# Patient Record
Sex: FEMALE | Race: BLACK OR AFRICAN AMERICAN | NOT HISPANIC OR LATINO | URBAN - METROPOLITAN AREA
[De-identification: names, ages, dates, MRNs, and addresses within clinical notes are randomized per-mention and may not be internally consistent; named-entity substitution may affect disease eponyms.]

---

## 2018-10-17 PROBLEM — 59621000 ESSENTIAL HYPERTENSION: Status: ACTIVE | Noted: 2018-10-17

## 2018-10-17 PROBLEM — 275978004 SCREENING FOR MALIGNANT NEOPLASM OF COLON: Status: ACTIVE | Noted: 2018-10-17

## 2020-09-18 ENCOUNTER — OFFICE VISIT (OUTPATIENT)
Dept: URBAN - METROPOLITAN AREA CLINIC 29 | Facility: CLINIC | Age: 70
End: 2020-09-18

## 2020-09-18 PROBLEM — 2901004 MELENA: Status: ACTIVE | Noted: 2020-09-18

## 2020-10-30 ENCOUNTER — OFFICE VISIT (OUTPATIENT)
Dept: URBAN - METROPOLITAN AREA SURGERY CENTER 7 | Facility: SURGERY CENTER | Age: 70
End: 2020-10-30

## 2020-11-11 ENCOUNTER — OFFICE VISIT (OUTPATIENT)
Dept: URBAN - METROPOLITAN AREA CLINIC 29 | Facility: CLINIC | Age: 70
End: 2020-11-11

## 2020-12-04 ENCOUNTER — OFFICE VISIT (OUTPATIENT)
Dept: URBAN - METROPOLITAN AREA CLINIC 29 | Facility: CLINIC | Age: 70
End: 2020-12-04

## 2021-01-25 ENCOUNTER — OFFICE VISIT (OUTPATIENT)
Dept: URBAN - METROPOLITAN AREA CLINIC 29 | Facility: CLINIC | Age: 71
End: 2021-01-25

## 2021-03-08 ENCOUNTER — OFFICE VISIT (OUTPATIENT)
Dept: URBAN - METROPOLITAN AREA CLINIC 29 | Facility: CLINIC | Age: 71
End: 2021-03-08

## 2021-03-08 PROBLEM — 70153002 HEMORRHOIDS: Status: ACTIVE | Noted: 2021-03-08

## 2021-03-08 PROBLEM — 414916001 OBESITY: Status: ACTIVE | Noted: 2021-03-08

## 2022-04-30 ENCOUNTER — TELEPHONE ENCOUNTER (OUTPATIENT)
Dept: URBAN - METROPOLITAN AREA CLINIC 121 | Facility: CLINIC | Age: 72
End: 2022-04-30

## 2022-04-30 RX ORDER — SODIUM PICOSULFATE, MAGNESIUM OXIDE, AND ANHYDROUS CITRIC ACID 10; 3.5; 12 MG/160ML; G/160ML; G/160ML
MIX AND USE AS DIRECTED LIQUID ORAL
OUTPATIENT
Start: 2018-10-17

## 2022-04-30 RX ORDER — SODIUM PICOSULFATE, MAGNESIUM OXIDE, AND ANHYDROUS CITRIC ACID 10; 3.5; 12 MG/160ML; G/160ML; G/160ML
MIX AND USE AS DIRECTED LIQUID ORAL
OUTPATIENT
Start: 2018-10-17 | End: 2020-10-30

## 2022-05-01 ENCOUNTER — TELEPHONE ENCOUNTER (OUTPATIENT)
Dept: URBAN - METROPOLITAN AREA CLINIC 121 | Facility: CLINIC | Age: 72
End: 2022-05-01

## 2022-05-01 RX ORDER — HYDROCODONE BITARTRATE AND ACETAMINOPHEN 7.5; 3 MG/1; MG/1
TABLET ORAL
Status: ACTIVE | COMMUNITY
Start: 2020-10-30

## 2022-05-01 RX ORDER — CLONIDINE HYDROCHLORIDE 0.1 MG/1
TABLET ORAL
Status: ACTIVE | COMMUNITY
Start: 2020-10-30

## 2022-05-01 RX ORDER — HYDRALAZINE HYDROCHLORIDE 100 MG/1
TABLET ORAL
Status: ACTIVE | COMMUNITY
Start: 2020-10-30

## 2022-05-01 RX ORDER — HYDROCHLOROTHIAZIDE 25 MG/1
TABLET ORAL
Status: ACTIVE | COMMUNITY
Start: 2020-10-30

## 2022-05-01 RX ORDER — CYCLOBENZAPRINE HYDROCHLORIDE 10 MG/1
TABLET, FILM COATED ORAL
Status: ACTIVE | COMMUNITY
Start: 2020-10-30

## 2022-05-01 RX ORDER — INSULIN GLARGINE 100 [IU]/ML
INJECTION, SOLUTION SUBCUTANEOUS
Status: ACTIVE | COMMUNITY
Start: 2020-10-30

## 2022-05-01 RX ORDER — METOPROLOL TARTRATE 100 MG/1
TABLET, FILM COATED ORAL
Status: ACTIVE | COMMUNITY
Start: 2020-10-30

## 2022-05-01 RX ORDER — POLYETHYLENE GLYCOL 3350, SODIUM SULFATE, SODIUM CHLORIDE, POTASSIUM CHLORIDE, ASCORBIC ACID, SODIUM ASCORBATE 140-9-5.2G
MIX AND USE DIRECTED.   USE CO-PAY CARD BELOW:  BIN 019158  PCN: CNRX GROUP: AC68037001 ID: 5937377999 KIT ORAL
Status: ACTIVE | COMMUNITY
Start: 2020-09-18

## 2022-05-01 RX ORDER — GLIPIZIDE 5 MG/1
TABLET, EXTENDED RELEASE ORAL
Status: ACTIVE | COMMUNITY
Start: 2020-10-30

## 2022-05-01 RX ORDER — AMLODIPINE BESYLATE 5 MG/1
TABLET ORAL
Status: ACTIVE | COMMUNITY
Start: 2020-10-30

## 2022-05-01 RX ORDER — ROSUVASTATIN CALCIUM 10 MG/1
TABLET, FILM COATED ORAL
Status: ACTIVE | COMMUNITY
Start: 2020-10-30

## 2022-05-01 RX ORDER — INSULIN LISPRO 100 [IU]/ML
INJECTION, SOLUTION INTRAVENOUS; SUBCUTANEOUS
Status: ACTIVE | COMMUNITY
Start: 2020-10-30

## 2022-05-01 RX ORDER — IRBESARTAN 300 MG/1
TABLET ORAL
Status: ACTIVE | COMMUNITY
Start: 2020-10-30

## 2023-11-20 ENCOUNTER — OFFICE VISIT (OUTPATIENT)
Dept: URBAN - METROPOLITAN AREA CLINIC 29 | Facility: CLINIC | Age: 73
End: 2023-11-20

## 2023-12-20 ENCOUNTER — CLAIMS CREATED FROM THE CLAIM WINDOW (OUTPATIENT)
Dept: URBAN - METROPOLITAN AREA CLINIC 29 | Facility: CLINIC | Age: 73
End: 2023-12-20
Payer: MEDICARE

## 2023-12-20 VITALS
BODY MASS INDEX: 34.68 KG/M2 | HEIGHT: 59 IN | SYSTOLIC BLOOD PRESSURE: 136 MMHG | HEART RATE: 97 BPM | WEIGHT: 172 LBS | DIASTOLIC BLOOD PRESSURE: 91 MMHG

## 2023-12-20 DIAGNOSIS — Z12.11 SCREEN FOR COLON CANCER: ICD-10-CM

## 2023-12-20 DIAGNOSIS — K59.04 CHRONIC IDIOPATHIC CONSTIPATION: ICD-10-CM

## 2023-12-20 PROCEDURE — 99213 OFFICE O/P EST LOW 20 MIN: CPT | Performed by: INTERNAL MEDICINE

## 2023-12-20 PROCEDURE — 99203 OFFICE O/P NEW LOW 30 MIN: CPT | Performed by: INTERNAL MEDICINE

## 2023-12-20 RX ORDER — ROSUVASTATIN CALCIUM 10 MG/1
TABLET, FILM COATED ORAL
Status: ACTIVE | COMMUNITY
Start: 2020-10-30

## 2023-12-20 RX ORDER — INSULIN LISPRO 100 [IU]/ML
INJECTION, SOLUTION INTRAVENOUS; SUBCUTANEOUS
Status: ACTIVE | COMMUNITY
Start: 2020-10-30

## 2023-12-20 RX ORDER — METOPROLOL TARTRATE 100 MG/1
TABLET, FILM COATED ORAL
Status: ACTIVE | COMMUNITY
Start: 2020-10-30

## 2023-12-20 RX ORDER — IRBESARTAN 300 MG/1
TABLET ORAL
Status: ACTIVE | COMMUNITY
Start: 2020-10-30

## 2023-12-20 RX ORDER — GLIPIZIDE 5 MG/1
TABLET, EXTENDED RELEASE ORAL
Status: ACTIVE | COMMUNITY
Start: 2020-10-30

## 2023-12-20 RX ORDER — INSULIN GLARGINE 100 [IU]/ML
INJECTION, SOLUTION SUBCUTANEOUS
Status: ACTIVE | COMMUNITY
Start: 2020-10-30

## 2023-12-20 RX ORDER — HYDRALAZINE HYDROCHLORIDE 100 MG/1
TABLET ORAL
Status: ACTIVE | COMMUNITY
Start: 2020-10-30

## 2023-12-20 RX ORDER — HYDROCHLOROTHIAZIDE 25 MG/1
TABLET ORAL
Status: ACTIVE | COMMUNITY
Start: 2020-10-30

## 2023-12-20 RX ORDER — CLONIDINE HYDROCHLORIDE 0.1 MG/1
TABLET ORAL
Status: ACTIVE | COMMUNITY
Start: 2020-10-30

## 2023-12-20 RX ORDER — HYDROCODONE BITARTRATE AND ACETAMINOPHEN 7.5; 3 MG/1; MG/1
TABLET ORAL
Status: ACTIVE | COMMUNITY
Start: 2020-10-30

## 2023-12-20 RX ORDER — AMLODIPINE BESYLATE 5 MG/1
TABLET ORAL
Status: ACTIVE | COMMUNITY
Start: 2020-10-30

## 2023-12-20 RX ORDER — CYCLOBENZAPRINE HYDROCHLORIDE 10 MG/1
TABLET, FILM COATED ORAL
Status: ACTIVE | COMMUNITY
Start: 2020-10-30

## 2023-12-20 RX ORDER — POLYETHYLENE GLYCOL 3350, SODIUM SULFATE, SODIUM CHLORIDE, POTASSIUM CHLORIDE, ASCORBIC ACID, SODIUM ASCORBATE 140-9-5.2G
MIX AND USE DIRECTED.   USE CO-PAY CARD BELOW:  BIN 019158  PCN: CNRX GROUP: AC68037001 ID: 5937377999 KIT ORAL
Status: ACTIVE | COMMUNITY
Start: 2020-09-18

## 2023-12-20 RX ORDER — SODIUM, POTASSIUM,MAG SULFATES 17.5-3.13G
177ML SOLUTION, RECONSTITUTED, ORAL ORAL
Qty: 1 KIT | Refills: 0 | OUTPATIENT
Start: 2023-12-20 | End: 2023-12-21

## 2023-12-20 NOTE — HPI-TODAY'S VISIT:
Ms. Dacosta  is a 73-year old woman here for follow up.  She has a colonoscopy with 3  TAs in 2019.   She recetnly started on ozempic and has had some nausea associated.  She states her stools have become less frequent, but she had not been consistent with her fiber gummies.

## 2023-12-27 ENCOUNTER — LAB OUTSIDE AN ENCOUNTER (OUTPATIENT)
Dept: URBAN - METROPOLITAN AREA CLINIC 29 | Facility: CLINIC | Age: 73
End: 2023-12-27

## 2024-01-18 ENCOUNTER — OFFICE VISIT (OUTPATIENT)
Dept: URBAN - METROPOLITAN AREA SURGERY CENTER 7 | Facility: SURGERY CENTER | Age: 74
End: 2024-01-18
Payer: MEDICARE

## 2024-01-18 DIAGNOSIS — Z86.010 PERSONAL HISTORY OF COLON POLYPS: ICD-10-CM

## 2024-01-18 DIAGNOSIS — Z86.010 ADENOMAS PERSONAL HISTORY OF COLONIC POLYPS: ICD-10-CM

## 2024-01-18 DIAGNOSIS — K64.8 HEMORRHOIDS, INTERNAL. NON-BLEEDING: ICD-10-CM

## 2024-01-18 DIAGNOSIS — Z09 ENCNTR FOR F/U EXAM AFT TRTMT FOR COND OTH THAN MALIG NEOPLM: ICD-10-CM

## 2024-01-18 PROCEDURE — G0105 COLORECTAL SCRN; HI RISK IND: HCPCS | Performed by: INTERNAL MEDICINE

## 2024-01-18 PROCEDURE — 00812 ANES LWR INTST SCR COLSC: CPT | Performed by: ANESTHESIOLOGY

## 2024-01-18 PROCEDURE — G8907 PT DOC NO EVENTS ON DISCHARG: HCPCS | Performed by: INTERNAL MEDICINE

## 2024-01-26 ENCOUNTER — TELEPHONE ENCOUNTER (OUTPATIENT)
Dept: URBAN - METROPOLITAN AREA CLINIC 29 | Facility: CLINIC | Age: 74
End: 2024-01-26

## 2024-01-31 ENCOUNTER — OFFICE VISIT (OUTPATIENT)
Dept: URBAN - METROPOLITAN AREA CLINIC 27 | Facility: CLINIC | Age: 74
End: 2024-01-31

## 2024-02-20 ENCOUNTER — HEM (OUTPATIENT)
Dept: URBAN - METROPOLITAN AREA CLINIC 29 | Facility: CLINIC | Age: 74
End: 2024-02-20

## 2024-02-20 VITALS
WEIGHT: 157 LBS | DIASTOLIC BLOOD PRESSURE: 82 MMHG | HEIGHT: 59 IN | SYSTOLIC BLOOD PRESSURE: 139 MMHG | BODY MASS INDEX: 31.65 KG/M2 | HEART RATE: 106 BPM

## 2024-02-20 RX ORDER — ROSUVASTATIN CALCIUM 10 MG/1
TABLET, FILM COATED ORAL
Status: ACTIVE | COMMUNITY
Start: 2020-10-30

## 2024-02-20 RX ORDER — LINACLOTIDE 290 UG/1
1 CAPSULE AT LEAST 30 MINUTES BEFORE THE FIRST MEAL OF THE DAY ON AN EMPTY STOMACH CAPSULE, GELATIN COATED ORAL ONCE A DAY
Qty: 30 | Refills: 3 | OUTPATIENT
Start: 2024-02-20 | End: 2024-06-19

## 2024-02-20 RX ORDER — METOPROLOL TARTRATE 100 MG/1
TABLET, FILM COATED ORAL
Status: ACTIVE | COMMUNITY
Start: 2020-10-30

## 2024-02-20 RX ORDER — IRBESARTAN 300 MG/1
TABLET ORAL
Status: ACTIVE | COMMUNITY
Start: 2020-10-30

## 2024-02-20 RX ORDER — HYDROCODONE BITARTRATE AND ACETAMINOPHEN 7.5; 3 MG/1; MG/1
TABLET ORAL
Status: ACTIVE | COMMUNITY
Start: 2020-10-30

## 2024-02-20 RX ORDER — HYDRALAZINE HYDROCHLORIDE 100 MG/1
TABLET ORAL
Status: ACTIVE | COMMUNITY
Start: 2020-10-30

## 2024-02-20 RX ORDER — INSULIN LISPRO 100 [IU]/ML
INJECTION, SOLUTION INTRAVENOUS; SUBCUTANEOUS
Status: ACTIVE | COMMUNITY
Start: 2020-10-30

## 2024-02-20 RX ORDER — POLYETHYLENE GLYCOL 3350, SODIUM SULFATE, SODIUM CHLORIDE, POTASSIUM CHLORIDE, ASCORBIC ACID, SODIUM ASCORBATE 140-9-5.2G
MIX AND USE DIRECTED.   USE CO-PAY CARD BELOW:  BIN 019158  PCN: CNRX GROUP: AC68037001 ID: 5937377999 KIT ORAL
Status: ACTIVE | COMMUNITY
Start: 2020-09-18

## 2024-02-20 RX ORDER — CLONIDINE HYDROCHLORIDE 0.1 MG/1
TABLET ORAL
Status: ACTIVE | COMMUNITY
Start: 2020-10-30

## 2024-02-20 RX ORDER — CYCLOBENZAPRINE HYDROCHLORIDE 10 MG/1
TABLET, FILM COATED ORAL
Status: ACTIVE | COMMUNITY
Start: 2020-10-30

## 2024-02-20 RX ORDER — HYDROCHLOROTHIAZIDE 25 MG/1
TABLET ORAL
Status: ACTIVE | COMMUNITY
Start: 2020-10-30

## 2024-02-20 RX ORDER — AMLODIPINE BESYLATE 5 MG/1
TABLET ORAL
Status: ACTIVE | COMMUNITY
Start: 2020-10-30

## 2024-02-20 RX ORDER — INSULIN GLARGINE 100 [IU]/ML
INJECTION, SOLUTION SUBCUTANEOUS
Status: ACTIVE | COMMUNITY
Start: 2020-10-30

## 2024-02-20 RX ORDER — GLIPIZIDE 5 MG/1
TABLET, EXTENDED RELEASE ORAL
Status: ACTIVE | COMMUNITY
Start: 2020-10-30

## 2024-03-25 ENCOUNTER — HEM (OUTPATIENT)
Dept: URBAN - METROPOLITAN AREA CLINIC 29 | Facility: CLINIC | Age: 74
End: 2024-03-25

## 2024-04-22 ENCOUNTER — HEM (OUTPATIENT)
Dept: URBAN - METROPOLITAN AREA CLINIC 29 | Facility: CLINIC | Age: 74
End: 2024-04-22
Payer: MEDICARE

## 2024-04-22 VITALS
BODY MASS INDEX: 31.85 KG/M2 | DIASTOLIC BLOOD PRESSURE: 99 MMHG | SYSTOLIC BLOOD PRESSURE: 156 MMHG | HEART RATE: 74 BPM | WEIGHT: 158 LBS | HEIGHT: 59 IN

## 2024-04-22 DIAGNOSIS — K64.1 GRADE II HEMORRHOIDS: ICD-10-CM

## 2024-04-22 PROCEDURE — 46221 LIGATION OF HEMORRHOID(S): CPT | Performed by: INTERNAL MEDICINE

## 2024-04-22 RX ORDER — GLIPIZIDE 5 MG/1
TABLET, EXTENDED RELEASE ORAL
Status: ACTIVE | COMMUNITY
Start: 2020-10-30

## 2024-04-22 RX ORDER — CLONIDINE HYDROCHLORIDE 0.1 MG/1
TABLET ORAL
Status: ACTIVE | COMMUNITY
Start: 2020-10-30

## 2024-04-22 RX ORDER — IRBESARTAN 300 MG/1
TABLET ORAL
Status: ACTIVE | COMMUNITY
Start: 2020-10-30

## 2024-04-22 RX ORDER — CYCLOBENZAPRINE HYDROCHLORIDE 10 MG/1
TABLET, FILM COATED ORAL
Status: ACTIVE | COMMUNITY
Start: 2020-10-30

## 2024-04-22 RX ORDER — HYDRALAZINE HYDROCHLORIDE 100 MG/1
TABLET ORAL
Status: ACTIVE | COMMUNITY
Start: 2020-10-30

## 2024-04-22 RX ORDER — HYDROCODONE BITARTRATE AND ACETAMINOPHEN 7.5; 3 MG/1; MG/1
TABLET ORAL
Status: ACTIVE | COMMUNITY
Start: 2020-10-30

## 2024-04-22 RX ORDER — INSULIN GLARGINE 100 [IU]/ML
INJECTION, SOLUTION SUBCUTANEOUS
Status: ACTIVE | COMMUNITY
Start: 2020-10-30

## 2024-04-22 RX ORDER — AMLODIPINE BESYLATE 5 MG/1
TABLET ORAL
Status: ACTIVE | COMMUNITY
Start: 2020-10-30

## 2024-04-22 RX ORDER — POLYETHYLENE GLYCOL 3350, SODIUM SULFATE, SODIUM CHLORIDE, POTASSIUM CHLORIDE, ASCORBIC ACID, SODIUM ASCORBATE 140-9-5.2G
MIX AND USE DIRECTED.   USE CO-PAY CARD BELOW:  BIN 019158  PCN: CNRX GROUP: AC68037001 ID: 5937377999 KIT ORAL
Status: ACTIVE | COMMUNITY
Start: 2020-09-18

## 2024-04-22 RX ORDER — HYDROCHLOROTHIAZIDE 25 MG/1
TABLET ORAL
Status: ACTIVE | COMMUNITY
Start: 2020-10-30

## 2024-04-22 RX ORDER — METOPROLOL TARTRATE 100 MG/1
TABLET, FILM COATED ORAL
Status: ACTIVE | COMMUNITY
Start: 2020-10-30

## 2024-04-22 RX ORDER — ROSUVASTATIN CALCIUM 10 MG/1
TABLET, FILM COATED ORAL
Status: ACTIVE | COMMUNITY
Start: 2020-10-30

## 2024-04-22 RX ORDER — INSULIN LISPRO 100 [IU]/ML
INJECTION, SOLUTION INTRAVENOUS; SUBCUTANEOUS
Status: ACTIVE | COMMUNITY
Start: 2020-10-30

## 2024-04-22 RX ORDER — LINACLOTIDE 290 UG/1
1 CAPSULE AT LEAST 30 MINUTES BEFORE THE FIRST MEAL OF THE DAY ON AN EMPTY STOMACH CAPSULE, GELATIN COATED ORAL ONCE A DAY
Qty: 30 | Refills: 3 | Status: ACTIVE | COMMUNITY
Start: 2024-02-20 | End: 2024-06-19

## 2024-05-08 ENCOUNTER — OFFICE VISIT (OUTPATIENT)
Dept: URBAN - METROPOLITAN AREA CLINIC 29 | Facility: CLINIC | Age: 74
End: 2024-05-08
Payer: MEDICARE

## 2024-05-08 VITALS
HEIGHT: 59 IN | HEART RATE: 73 BPM | WEIGHT: 162 LBS | BODY MASS INDEX: 32.66 KG/M2 | SYSTOLIC BLOOD PRESSURE: 158 MMHG | RESPIRATION RATE: 17 BRPM | DIASTOLIC BLOOD PRESSURE: 95 MMHG

## 2024-05-08 DIAGNOSIS — K64.1 GRADE II HEMORRHOIDS: ICD-10-CM

## 2024-05-08 PROCEDURE — 46221 LIGATION OF HEMORRHOID(S): CPT | Performed by: INTERNAL MEDICINE

## 2024-05-08 RX ORDER — ROSUVASTATIN CALCIUM 10 MG/1
TABLET, FILM COATED ORAL
Status: ACTIVE | COMMUNITY
Start: 2020-10-30

## 2024-05-08 RX ORDER — HYDROCODONE BITARTRATE AND ACETAMINOPHEN 7.5; 3 MG/1; MG/1
TABLET ORAL
Status: ACTIVE | COMMUNITY
Start: 2020-10-30

## 2024-05-08 RX ORDER — METOPROLOL TARTRATE 100 MG/1
TABLET, FILM COATED ORAL
Status: ACTIVE | COMMUNITY
Start: 2020-10-30

## 2024-05-08 RX ORDER — CYCLOBENZAPRINE HYDROCHLORIDE 10 MG/1
TABLET, FILM COATED ORAL
Status: ACTIVE | COMMUNITY
Start: 2020-10-30

## 2024-05-08 RX ORDER — INSULIN GLARGINE 100 [IU]/ML
INJECTION, SOLUTION SUBCUTANEOUS
Status: ACTIVE | COMMUNITY
Start: 2020-10-30

## 2024-05-08 RX ORDER — LINACLOTIDE 290 UG/1
1 CAPSULE AT LEAST 30 MINUTES BEFORE THE FIRST MEAL OF THE DAY ON AN EMPTY STOMACH CAPSULE, GELATIN COATED ORAL ONCE A DAY
Qty: 30 | Refills: 3 | Status: ACTIVE | COMMUNITY
Start: 2024-02-20 | End: 2024-06-19

## 2024-05-08 RX ORDER — HYDRALAZINE HYDROCHLORIDE 100 MG/1
TABLET ORAL
Status: ACTIVE | COMMUNITY
Start: 2020-10-30

## 2024-05-08 RX ORDER — HYDROCHLOROTHIAZIDE 25 MG/1
TABLET ORAL
Status: ACTIVE | COMMUNITY
Start: 2020-10-30

## 2024-05-08 RX ORDER — POLYETHYLENE GLYCOL 3350, SODIUM SULFATE, SODIUM CHLORIDE, POTASSIUM CHLORIDE, ASCORBIC ACID, SODIUM ASCORBATE 140-9-5.2G
MIX AND USE DIRECTED.   USE CO-PAY CARD BELOW:  BIN 019158  PCN: CNRX GROUP: AC68037001 ID: 5937377999 KIT ORAL
Status: ACTIVE | COMMUNITY
Start: 2020-09-18

## 2024-05-08 RX ORDER — INSULIN LISPRO 100 [IU]/ML
INJECTION, SOLUTION INTRAVENOUS; SUBCUTANEOUS
Status: ACTIVE | COMMUNITY
Start: 2020-10-30

## 2024-05-08 RX ORDER — CLONIDINE HYDROCHLORIDE 0.1 MG/1
TABLET ORAL
Status: ACTIVE | COMMUNITY
Start: 2020-10-30

## 2024-05-08 RX ORDER — AMLODIPINE BESYLATE 5 MG/1
TABLET ORAL
Status: ACTIVE | COMMUNITY
Start: 2020-10-30

## 2024-05-08 RX ORDER — IRBESARTAN 300 MG/1
TABLET ORAL
Status: ACTIVE | COMMUNITY
Start: 2020-10-30

## 2024-05-08 RX ORDER — GLIPIZIDE 5 MG/1
TABLET, EXTENDED RELEASE ORAL
Status: ACTIVE | COMMUNITY
Start: 2020-10-30

## 2024-07-09 ENCOUNTER — OFFICE VISIT (OUTPATIENT)
Dept: URBAN - METROPOLITAN AREA CLINIC 29 | Facility: CLINIC | Age: 74
End: 2024-07-09

## 2024-07-23 ENCOUNTER — OFFICE VISIT (OUTPATIENT)
Dept: URBAN - METROPOLITAN AREA CLINIC 29 | Facility: CLINIC | Age: 74
End: 2024-07-23

## 2024-07-23 ENCOUNTER — DASHBOARD ENCOUNTERS (OUTPATIENT)
Age: 74
End: 2024-07-23

## 2024-07-23 VITALS
WEIGHT: 175 LBS | HEIGHT: 59 IN | HEART RATE: 76 BPM | SYSTOLIC BLOOD PRESSURE: 160 MMHG | BODY MASS INDEX: 35.28 KG/M2 | DIASTOLIC BLOOD PRESSURE: 93 MMHG

## 2024-07-23 RX ORDER — POLYETHYLENE GLYCOL 3350, SODIUM SULFATE, SODIUM CHLORIDE, POTASSIUM CHLORIDE, ASCORBIC ACID, SODIUM ASCORBATE 140-9-5.2G
MIX AND USE DIRECTED.   USE CO-PAY CARD BELOW:  BIN 019158  PCN: CNRX GROUP: AC68037001 ID: 5937377999 KIT ORAL
Status: ACTIVE | COMMUNITY
Start: 2020-09-18

## 2024-07-23 RX ORDER — IRBESARTAN 300 MG/1
TABLET ORAL
Status: ACTIVE | COMMUNITY
Start: 2020-10-30

## 2024-07-23 RX ORDER — CYCLOBENZAPRINE HYDROCHLORIDE 10 MG/1
TABLET, FILM COATED ORAL
Status: ACTIVE | COMMUNITY
Start: 2020-10-30

## 2024-07-23 RX ORDER — HYDROCHLOROTHIAZIDE 25 MG/1
TABLET ORAL
Status: ACTIVE | COMMUNITY
Start: 2020-10-30

## 2024-07-23 RX ORDER — INSULIN GLARGINE 100 [IU]/ML
INJECTION, SOLUTION SUBCUTANEOUS
Status: ACTIVE | COMMUNITY
Start: 2020-10-30

## 2024-07-23 RX ORDER — ROSUVASTATIN CALCIUM 10 MG/1
TABLET, FILM COATED ORAL
Status: ACTIVE | COMMUNITY
Start: 2020-10-30

## 2024-07-23 RX ORDER — AMLODIPINE BESYLATE 5 MG/1
TABLET ORAL
Status: ACTIVE | COMMUNITY
Start: 2020-10-30

## 2024-07-23 RX ORDER — GLIPIZIDE 5 MG/1
TABLET, EXTENDED RELEASE ORAL
Status: ACTIVE | COMMUNITY
Start: 2020-10-30

## 2024-07-23 RX ORDER — CLONIDINE HYDROCHLORIDE 0.1 MG/1
TABLET ORAL
Status: ACTIVE | COMMUNITY
Start: 2020-10-30

## 2024-07-23 RX ORDER — INSULIN LISPRO 100 [IU]/ML
INJECTION, SOLUTION INTRAVENOUS; SUBCUTANEOUS
Status: ACTIVE | COMMUNITY
Start: 2020-10-30

## 2024-07-23 RX ORDER — METOPROLOL TARTRATE 100 MG/1
TABLET, FILM COATED ORAL
Status: ACTIVE | COMMUNITY
Start: 2020-10-30

## 2024-07-23 RX ORDER — HYDRALAZINE HYDROCHLORIDE 100 MG/1
TABLET ORAL
Status: ACTIVE | COMMUNITY
Start: 2020-10-30

## 2024-07-23 RX ORDER — HYDROCODONE BITARTRATE AND ACETAMINOPHEN 7.5; 3 MG/1; MG/1
TABLET ORAL
Status: ACTIVE | COMMUNITY
Start: 2020-10-30

## 2024-07-23 NOTE — HPI-TODAY'S VISIT:
Ms. Scott hillman is a 74-year-old woman who is here for follow up.  She has been 1 month past her hemorrhoid banding.  She states that the has had good BMS since she had been on fiber gummies, and she had had no further episode of bleeding.

## 2025-06-16 ENCOUNTER — ERX REFILL RESPONSE (OUTPATIENT)
Dept: URBAN - METROPOLITAN AREA CLINIC 29 | Facility: CLINIC | Age: 75
End: 2025-06-16

## 2025-06-16 RX ORDER — LINACLOTIDE 290 UG/1
TAKE 1 CAPSULE BY MOUTH EVERY DAY 30 MINUTES BEFORE THE FIRST MEAL OF THE DAY ON EMPTY STOMACH CAPSULE, GELATIN COATED ORAL
Qty: 30 CAPSULE | Refills: 3 | OUTPATIENT